# Patient Record
Sex: FEMALE | Race: WHITE
[De-identification: names, ages, dates, MRNs, and addresses within clinical notes are randomized per-mention and may not be internally consistent; named-entity substitution may affect disease eponyms.]

---

## 2017-11-16 ENCOUNTER — HOSPITAL ENCOUNTER (EMERGENCY)
Dept: HOSPITAL 1 - ED | Age: 32
Discharge: HOME | End: 2017-11-16
Payer: MEDICAID

## 2017-11-16 VITALS — SYSTOLIC BLOOD PRESSURE: 112 MMHG | DIASTOLIC BLOOD PRESSURE: 73 MMHG

## 2017-11-16 DIAGNOSIS — R03.0: ICD-10-CM

## 2017-11-16 DIAGNOSIS — N39.0: Primary | ICD-10-CM

## 2017-11-16 LAB
MICROSCOPIC UR-IMP: YES
RBC # UR STRIP.AUTO: (no result) /UL
UA SPECIFIC GRAVITY: 1.02 (ref 1–1.03)

## 2018-05-04 ENCOUNTER — HOSPITAL ENCOUNTER (EMERGENCY)
Dept: HOSPITAL 1 - ED | Age: 33
Discharge: HOME | End: 2018-05-04
Payer: MEDICAID

## 2018-05-04 VITALS — DIASTOLIC BLOOD PRESSURE: 111 MMHG | SYSTOLIC BLOOD PRESSURE: 149 MMHG

## 2018-05-04 VITALS — HEIGHT: 60 IN | BODY MASS INDEX: 35.53 KG/M2 | WEIGHT: 181 LBS

## 2018-05-04 DIAGNOSIS — M54.5: Primary | ICD-10-CM

## 2018-08-16 ENCOUNTER — HOSPITAL ENCOUNTER (EMERGENCY)
Dept: HOSPITAL 1 - ED | Age: 33
Discharge: HOME | End: 2018-08-16
Payer: MEDICAID

## 2018-08-16 VITALS — SYSTOLIC BLOOD PRESSURE: 142 MMHG | DIASTOLIC BLOOD PRESSURE: 85 MMHG

## 2018-08-16 VITALS — WEIGHT: 196 LBS | HEIGHT: 61 IN | BODY MASS INDEX: 37 KG/M2

## 2018-08-16 DIAGNOSIS — M54.9: ICD-10-CM

## 2018-08-16 DIAGNOSIS — G89.29: Primary | ICD-10-CM

## 2018-08-16 DIAGNOSIS — E78.00: ICD-10-CM

## 2018-08-28 ENCOUNTER — HOSPITAL ENCOUNTER (EMERGENCY)
Dept: HOSPITAL 1 - ED | Age: 33
Discharge: HOME | End: 2018-08-28
Payer: MEDICAID

## 2018-08-28 VITALS — DIASTOLIC BLOOD PRESSURE: 80 MMHG | SYSTOLIC BLOOD PRESSURE: 138 MMHG

## 2018-08-28 VITALS
HEIGHT: 62 IN | HEIGHT: 62 IN | BODY MASS INDEX: 51.53 KG/M2 | WEIGHT: 280 LBS | WEIGHT: 280 LBS | BODY MASS INDEX: 51.53 KG/M2

## 2018-08-28 DIAGNOSIS — G89.29: Primary | ICD-10-CM

## 2018-08-28 DIAGNOSIS — M51.27: ICD-10-CM

## 2018-08-28 DIAGNOSIS — M81.0: ICD-10-CM

## 2018-08-28 DIAGNOSIS — M54.5: ICD-10-CM

## 2018-08-28 DIAGNOSIS — E78.00: ICD-10-CM

## 2019-03-17 ENCOUNTER — HOSPITAL ENCOUNTER (INPATIENT)
Dept: HOSPITAL 1 - ED | Age: 34
LOS: 1 days | Discharge: HOME | DRG: 420 | End: 2019-03-18
Attending: FAMILY MEDICINE | Admitting: FAMILY MEDICINE
Payer: MEDICAID

## 2019-03-17 VITALS
HEIGHT: 61 IN | WEIGHT: 176 LBS | BODY MASS INDEX: 33.23 KG/M2 | BODY MASS INDEX: 33.23 KG/M2 | HEIGHT: 61 IN | WEIGHT: 176 LBS

## 2019-03-17 VITALS — DIASTOLIC BLOOD PRESSURE: 86 MMHG | SYSTOLIC BLOOD PRESSURE: 120 MMHG

## 2019-03-17 VITALS — DIASTOLIC BLOOD PRESSURE: 73 MMHG | SYSTOLIC BLOOD PRESSURE: 113 MMHG

## 2019-03-17 DIAGNOSIS — T38.3X5A: ICD-10-CM

## 2019-03-17 DIAGNOSIS — E78.5: ICD-10-CM

## 2019-03-17 DIAGNOSIS — D68.69: ICD-10-CM

## 2019-03-17 DIAGNOSIS — E66.9: ICD-10-CM

## 2019-03-17 DIAGNOSIS — E11.649: Primary | ICD-10-CM

## 2019-03-17 DIAGNOSIS — D72.829: ICD-10-CM

## 2019-03-17 DIAGNOSIS — Z79.84: ICD-10-CM
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
## 2019-03-17 LAB
ALBUMIN SERPL-MCNC: 4.3 G/DL (ref 3.4–5)
ALP SERPL-CCNC: 69 U/L (ref 46–116)
ALT SERPL-CCNC: 14 U/L (ref 14–59)
AMPHETAMINES UR QL SCN: (no result)
AST SERPL-CCNC: 15 U/L (ref 15–37)
BASOPHILS NFR BLD: 1.2 % (ref 0–2)
BILIRUB SERPL-MCNC: 0.26 MG/DL (ref 0.2–1)
BUN SERPL-MCNC: 8 MG/DL (ref 7–18)
CALCIUM SERPL-MCNC: 9.2 MG/DL (ref 8.5–10.1)
CHLORIDE SERPL-SCNC: 102 MMOL/L (ref 98–107)
CHOLEST SERPL-MCNC: 222 MG/DL (ref ?–200)
CHOLEST/HDLC SERPL: 4.5 MG/DL
CO2 SERPL-SCNC: 27.1 MMOL/L (ref 21–32)
CREAT SERPL-MCNC: 0.7 MG/DL (ref 0.6–1)
ERYTHROCYTE [DISTWIDTH] IN BLOOD BY AUTOMATED COUNT: 12.9 % (ref 11.5–14.5)
GFR SERPLBLD BASED ON 1.73 SQ M-ARVRAT: > 60 ML/MIN
GLUCOSE SERPL-MCNC: 126 MG/DL (ref 74–106)
HDLC SERPL-MCNC: 49 MG/DL (ref 40–60)
MAGNESIUM SERPL-MCNC: 1.8 MG/DL (ref 1.8–2.4)
MICROSCOPIC UR-IMP: YES
PHOSPHATE SERPL-MCNC: 3.6 MG/DL (ref 2.5–4.9)
PLATELET # BLD: 427 X10^3MCL (ref 130–400)
POTASSIUM SERPL-SCNC: 3.5 MMOL/L (ref 3.5–5.1)
PROT SERPL-MCNC: 8.5 G/DL (ref 6.4–8.2)
RBC # UR STRIP.AUTO: (no result) /UL
SODIUM SERPL-SCNC: 139 MMOL/L (ref 136–145)
T3 SERPL-MCNC: 1.15 NG/ML
T3RU NFR SERPL: 33 % UPTAKE (ref 30–39)
T4 FREE SERPL-MCNC: 1 NG/DL (ref 0.76–1.46)
T4 SERPL-MCNC: 10.2 UG/DL (ref 4.7–13.3)
T4/T3 UPTAKE INDEX SERPL: 3.4 UG/DL (ref 1.4–4.5)
TRIGL SERPL-MCNC: 236 MG/DL (ref ?–150)
UA SPECIFIC GRAVITY: <=1.005 (ref 1–1.03)

## 2019-03-18 VITALS — DIASTOLIC BLOOD PRESSURE: 72 MMHG | SYSTOLIC BLOOD PRESSURE: 124 MMHG

## 2019-03-18 VITALS — SYSTOLIC BLOOD PRESSURE: 126 MMHG | DIASTOLIC BLOOD PRESSURE: 79 MMHG

## 2019-03-18 LAB
BASOPHILS NFR BLD: 0.4 % (ref 0–2)
BUN SERPL-MCNC: 8 MG/DL (ref 7–18)
CALCIUM SERPL-MCNC: 8.6 MG/DL (ref 8.5–10.1)
CHLORIDE SERPL-SCNC: 103 MMOL/L (ref 98–107)
CO2 SERPL-SCNC: 27.2 MMOL/L (ref 21–32)
CREAT SERPL-MCNC: 0.7 MG/DL (ref 0.6–1)
ERYTHROCYTE [DISTWIDTH] IN BLOOD BY AUTOMATED COUNT: 12.6 % (ref 11.5–14.5)
GFR SERPLBLD BASED ON 1.73 SQ M-ARVRAT: > 60 ML/MIN
GLUCOSE SERPL-MCNC: 109 MG/DL (ref 74–106)
PLATELET # BLD: 402 X10^3MCL (ref 130–400)
POTASSIUM SERPL-SCNC: 3.9 MMOL/L (ref 3.5–5.1)
SODIUM SERPL-SCNC: 139 MMOL/L (ref 136–145)

## 2019-09-20 ENCOUNTER — HOSPITAL ENCOUNTER (EMERGENCY)
Dept: HOSPITAL 26 - MED | Age: 34
Discharge: HOME | End: 2019-09-20
Payer: COMMERCIAL

## 2019-09-20 VITALS — DIASTOLIC BLOOD PRESSURE: 84 MMHG | SYSTOLIC BLOOD PRESSURE: 139 MMHG

## 2019-09-20 VITALS — HEIGHT: 61 IN | WEIGHT: 180 LBS | BODY MASS INDEX: 33.99 KG/M2

## 2019-09-20 VITALS — SYSTOLIC BLOOD PRESSURE: 145 MMHG | DIASTOLIC BLOOD PRESSURE: 92 MMHG

## 2019-09-20 DIAGNOSIS — S09.90XA: ICD-10-CM

## 2019-09-20 DIAGNOSIS — S30.0XXA: ICD-10-CM
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
## 2019-09-20 DIAGNOSIS — S40.012A: Primary | ICD-10-CM
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
## 2019-09-20 PROCEDURE — 81025 URINE PREGNANCY TEST: CPT

## 2019-09-20 PROCEDURE — 96372 THER/PROPH/DIAG INJ SC/IM: CPT

## 2019-09-20 PROCEDURE — 99283 EMERGENCY DEPT VISIT LOW MDM: CPT
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
## 2019-09-20 NOTE — NUR
Patient discharged with v/s stable. Written and verbal after care instructions 
given and explained. 

Patient alert, oriented and verbalized understanding of instructions. 
Ambulatory with steady gait. All questions addressed prior to discharge. ID 
band removed. Patient advised to follow up with PMD. Rx of TYLENOL, MOTRIN 
given. Patient educated on indication of medication including possible reaction 
and side effects. Opportunity to ask questions provided and answered.

## 2020-12-05 ENCOUNTER — HOSPITAL ENCOUNTER (EMERGENCY)
Dept: HOSPITAL 1 - ED | Age: 35
Discharge: HOME | End: 2020-12-05
Payer: COMMERCIAL

## 2020-12-05 VITALS — HEIGHT: 61 IN | BODY MASS INDEX: 33.99 KG/M2 | WEIGHT: 180 LBS

## 2020-12-05 VITALS — DIASTOLIC BLOOD PRESSURE: 106 MMHG | SYSTOLIC BLOOD PRESSURE: 161 MMHG

## 2020-12-05 DIAGNOSIS — Z20.828: ICD-10-CM

## 2020-12-05 DIAGNOSIS — G89.29: ICD-10-CM

## 2020-12-05 DIAGNOSIS — R05: Primary | ICD-10-CM

## 2020-12-05 DIAGNOSIS — J02.9: ICD-10-CM

## 2020-12-05 DIAGNOSIS — M81.0: ICD-10-CM

## 2020-12-05 DIAGNOSIS — R07.0: ICD-10-CM

## 2020-12-05 DIAGNOSIS — M79.10: ICD-10-CM

## 2020-12-05 DIAGNOSIS — E78.00: ICD-10-CM

## 2020-12-05 DIAGNOSIS — R51.9: ICD-10-CM

## 2020-12-05 DIAGNOSIS — E11.9: ICD-10-CM

## 2020-12-05 PROCEDURE — U0003 INFECTIOUS AGENT DETECTION BY NUCLEIC ACID (DNA OR RNA); SEVERE ACUTE RESPIRATORY SYNDROME CORONAVIRUS 2 (SARS-COV-2) (CORONAVIRUS DISEASE [COVID-19]), AMPLIFIED PROBE TECHNIQUE, MAKING USE OF HIGH THROUGHPUT TECHNOLOGIES AS DESCRIBED BY CMS-2020-01-R: HCPCS
